# Patient Record
Sex: MALE | Race: AMERICAN INDIAN OR ALASKA NATIVE
[De-identification: names, ages, dates, MRNs, and addresses within clinical notes are randomized per-mention and may not be internally consistent; named-entity substitution may affect disease eponyms.]

---

## 2018-07-13 NOTE — HISTORY AND PHYSICAL REPORT
History of Present Illness


Chief complaint: 





I need help, I cant stop drinking


History of present illness: 


47 YO Male with Obesity, HTN, HLD, ETOH Dependence admitted directly for 

Alcohol Withdrawl Syndrome. Pt states that he drinks vodka daily, and that his 

last drink was this morning. Pt also states that he has experienced nausea, 

feeling helpless, agitation, insomnia, dizziness, abdominal discomfort, 

agitation, confusion, headaches, tremors. Pt also acknowledges feeling anxious. 

Pt seen and evaluated upon arrival to have ETOH Dependence with Alcohol 

Withdrawl. Pt admitted to [facility] for medical stabilization.  











Past History


Past Medical History: hypertension, hyperlipidemia


Past Surgical History: total hip replacement


Social history: single, alcohol abuse


Family history: hypertension





Medications and Allergies


 Allergies











Allergy/AdvReac Type Severity Reaction Status Date / Time


 


Iodine and Iodide Containing AdvReac  Anaphylaxis Verified 07/13/18 16:53





Produc     


 


latex AdvReac  Anaphylaxis Verified 07/13/18 16:52


 


shrimp AdvReac  Anaphylaxis Verified 07/13/18 16:50














Review of Systems


Constitutional: no weight loss, no weight gain, no fever, no chills


Ears, nose, mouth and throat: no ear pain, no ear discharge, no tinnitis, no 

decreased hearing, no nose pain, no nasal congestion, no nasal discharge


Cardiovascular: no chest pain, no orthopnea, no palpitations, no rapid/

irregular heart beat, no edema, no syncope, no lightheadedness, no shortness of 

breath


Respiratory: no cough, no cough with sputum, no excessive sputum, no hemoptysis

, no shortness of breath, no dyspnea on exertion


Gastrointestinal: abdominal pain, nausea, no vomiting, no diarrhea, no 

constipation, no melena, no hematochezia


Genitourinary Male: no hematuria, no flank pain, no discharge, no urinary 

frequency, no urinary hesitancy


Rectal: no pain, no incontinence, no bleeding


Musculoskeletal: no neck stiffness, no neck pain, no shooting arm pain, no arm 

numbness/tingling, no low back pain, no shooting leg pain, no leg numbness/

tingling


Integumentary: no rash, no pruritis, no redness, no sores, no wounds


Neurological: no transient paralysis, no paralysis, no weakness, no parathesias


Psychiatric: anxiety, change in sleep habits, insomnia, hopelessness, anhedonia

, anxiety attacks, confusion, irritability


Endocrine: no cold intolerance, no heat intolerance, no polyphagia, no 

excessive thirst, no polydipsia, no polyuria


Hematologic/Lymphatic: no easy bruising, no easy bleeding, no lymphadenopathy, 

no lymphedema


Allergic/Immunologic: no urticaria, no allergic rhinitis, no wheezing, no 

persistent infections, no anaphylaxis, no angioedema





Exam





- Constitutional


General appearance: Present: obese





- EENT


Eyes: Present: PERRL


ENT: hearing intact, clear oral mucosa





- Neck


Neck: Present: supple, normal ROM





- Respiratory


Respiratory effort: normal


Respiratory: bilateral: CTA





- Cardiovascular


Heart Sounds: Present: S1 & S2.  Absent: rub, click





- Extremities


Extremities: pulses symmetrical, No edema


Peripheral Pulses: within normal limits





- Abdominal


General gastrointestinal: Present: soft, non-tender, non-distended, normal 

bowel sounds


Male genitourinary: Present: normal





- Integumentary


Integumentary: Present: clear, warm, dry





- Musculoskeletal


Musculoskeletal: gait normal, strength equal bilaterally





- Psychiatric


Psychiatric: appropriate mood/affect, intact judgment & insight, agitated





- Neurologic


Neurologic: CNII-XII intact, moves all extremities





Results





- Labs


CBC & Chem 7: 


 07/13/18 18:26








Assessment and Plan





- Patient Problems


(1) Alcohol withdrawal syndrome with perceptual disturbance


Current Visit: Yes   Status: Acute   


Plan to address problem: 


Alcohol withdrawl protocol: Librium Taper, Ativan PRN, IVF resuscitation, CBC, 

CMP, Urinalysis, BAL, Screening EKG, banana bag, 








(2) HTN (hypertension)


Current Visit: Yes   Status: Acute   


Qualifiers: 


   Hypertension type: essential hypertension   Qualified Code(s): I10 - 

Essential (primary) hypertension   


Plan to address problem: 


monitor bp q shift, resume prehospital medication








(3) HLD (hyperlipidemia)


Current Visit: Yes   Status: Acute   


Qualifiers: 


   Hyperlipidemia type: mixed hyperlipidemia   Qualified Code(s): E78.2 - Mixed 

hyperlipidemia   


Plan to address problem: 


continue prehospital medication.








(4) DVT prophylaxis


Current Visit: Yes   Status: Acute   


Plan to address problem: 


SCD to BLE while in bed.

## 2018-07-14 NOTE — PROGRESS NOTE
Assessment and Plan


Assessment and plan: 


-- Alcohol withdrawal syndrome with perceptual disturbance


Alcohol withdrawl protocol:


Librium Taper, Ativan PRN, IVF resuscitation,


And he is supportive care





--Alcoholic Liver Disease


Closely monitor LFTs,supportive care





-- HTN (hypertension)


monitor bp q shift, resume prehospital medication





-- HLD (hyperlipidemia)


continue prehospital medication. 





--Chronic alcohol use;


 Counseling strongly advised to quit alcohol intake





-- DVT prophylaxis


SCD to BLE while in bed





Closely monitor the patient and adjust her management as needed


Plan of care is reviewed with the patient and his nurse





History


Interval history: 


Patient seen and examined medical records reviewed


Patient feels better no new complaints


Vital signs reviewed


Alert awake oriented 3 not in acute distress








Hospitalist Physical





- Constitutional


Vitals: 


 











Temp Pulse Resp BP Pulse Ox


 


 98.7 F   101 H  18   104/72   97 


 


 07/14/18 07:48  07/14/18 07:48  07/14/18 07:48  07/14/18 07:48  07/14/18 07:48











General appearance: Present: no acute distress, well-nourished, obese, other (

no tremulousness or agitation)





- EENT


Eyes: Present: PERRL, EOM intact





- Neck


Neck: Present: supple, normal ROM





- Respiratory


Respiratory effort: normal


Respiratory: bilateral: diminished, negative: rales, rhonchi, wheezing





- Cardiovascular


Rhythm: regular


Heart Sounds: Present: S1 & S2





- Extremities


Extremities: no ischemia, No edema





- Abdominal


General gastrointestinal: soft, non-tender, non-distended, normal bowel sounds





- Integumentary


Integumentary: Present: clear, warm





- Psychiatric


Psychiatric: appropriate mood/affect, cooperative





- Neurologic


Neurologic: CNII-XII intact, moves all extremities





Results





- Labs


CBC & Chem 7: 


 07/13/18 18:26





 07/13/18 18:26


Labs: 


 Laboratory Last Values











WBC  5.3 K/mm3 (4.5-11.0)   07/13/18  18:26    


 


RBC  5.20 M/mm3 (3.65-5.03)  H  07/13/18  18:26    


 


Hgb  13.7 gm/dl (11.8-15.2)   07/13/18  18:26    


 


Hct  41.5 % (35.5-45.6)   07/13/18  18:26    


 


MCV  80 fl (84-94)  L  07/13/18  18:26    


 


MCH  26 pg (28-32)  L  07/13/18  18:26    


 


MCHC  33 % (32-34)   07/13/18  18:26    


 


RDW  18.0 % (13.2-15.2)  H  07/13/18  18:26    


 


Plt Count  295 K/mm3 (140-440)   07/13/18  18:26    


 


PT  14.3 Sec. (12.2-14.9)   07/13/18  18:26    


 


INR  1.05  (0.87-1.13)   07/13/18  18:26    


 


Sodium  138 mmol/L (137-145)   07/13/18  18:26    


 


Potassium  4.0 mmol/L (3.6-5.0)   07/13/18 18:26    


 


Chloride  100.0 mmol/L ()   07/13/18  18:26    


 


Carbon Dioxide  19 mmol/L (22-30)  L  07/13/18  18:26    


 


Anion Gap  23 mmol/L  07/13/18  18:26    


 


BUN  10 mg/dL (9-20)   07/13/18  18:26    


 


Creatinine  0.7 mg/dL (0.8-1.5)  L  07/13/18  18:26    


 


Estimated GFR  > 60 ml/min  07/13/18  18:26    


 


BUN/Creatinine Ratio  14 %  07/13/18  18:26    


 


Glucose  122 mg/dL ()  H  07/13/18  18:26    


 


Calcium  9.2 mg/dL (8.4-10.2)   07/13/18  18:26    


 


Total Bilirubin  0.60 mg/dL (0.1-1.2)   07/13/18  18:26    


 


AST  119 units/L (5-40)  H  07/13/18  18:26    


 


ALT  62 units/L (7-56)  H  07/13/18  18:26    


 


Alkaline Phosphatase  145 units/L ()  H  07/13/18  18:26    


 


Total Protein  8.0 g/dL (6.3-8.2)   07/13/18  18:26    


 


Albumin  4.3 g/dL (3.9-5)   07/13/18  18:26    


 


Albumin/Globulin Ratio  1.2 %  07/13/18  18:26    


 


Urine Color  Yellow  (Yellow)   07/14/18  04:00    


 


Urine Turbidity  Clear  (Clear)   07/14/18  04:00    


 


Urine pH  5.0  (5.0-7.0)   07/14/18  04:00    


 


Ur Specific Gravity  1.027  (1.003-1.030)   07/14/18  04:00    


 


Urine Protein  <15 mg/dl mg/dL (Negative)   07/14/18  04:00    


 


Urine Glucose (UA)  50 mg/dL (Negative)   07/14/18  04:00    


 


Urine Ketones  Neg mg/dL (Negative)   07/14/18  04:00    


 


Urine Blood  Neg  (Negative)   07/14/18  04:00    


 


Urine Nitrite  Neg  (Negative)   07/14/18  04:00    


 


Urine Bilirubin  Neg  (Negative)   07/14/18  04:00    


 


Urine Urobilinogen  < 2.0 mg/dL (<2.0)   07/14/18  04:00    


 


Ur Leukocyte Esterase  Neg  (Negative)   07/14/18  04:00    


 


Urine WBC (Auto)  < 1.0 /HPF (0.0-6.0)   07/14/18  04:00    


 


Urine RBC (Auto)  3.0 /HPF (0.0-6.0)   07/14/18  04:00    


 


Urine Mucus  1+ /HPF  07/14/18  04:00    


 


Plasma/Serum Alcohol  0.27 % (0-0.07)  H  07/13/18  18:26

## 2018-07-15 NOTE — PROGRESS NOTE
Assessment and Plan


Assessment and plan: 


--Diarrhea


 loperamide, supportive care, advised plenty of fluids





-- Alcohol withdrawal syndrome with perceptual disturbance


Alcohol withdrawl protocol:


Librium Taper, Ativan PRN, IVF resuscitation, 


And he is supportive care





--Alcoholic Liver Disease


Closely monitor LFTs,supportive care





-- HTN (hypertension)


monitor bp q shift, resume prehospital medication





-- HLD (hyperlipidemia)


continue prehospital medication. 





--Chronic alcohol use;


 Counseling strongly advised to quit alcohol intake





-- DVT prophylaxis


SCD to BLE while in bed





Closely monitor the patient and adjust her management as needed


Plan of care is reviewed with the patient and his nurse








History


Interval history: 


Since seen and examined medical records reviewed


Complains of some diarrhea


No tremulousness or agitation or aggression


Alert awake oriented 3 


vital signs reviewed








Hospitalist Physical





- Constitutional


Vitals: 


 











Temp Pulse Resp BP Pulse Ox


 


 98.4 F   101 H  20   123/83   97 


 


 07/15/18 07:43  07/15/18 07:43  07/15/18 07:43  07/15/18 07:43  07/15/18 07:43











General appearance: Present: no acute distress, well-nourished, obese, other (

no tremulousness or agitation)





- EENT


Eyes: Present: PERRL, EOM intact





- Neck


Neck: Present: supple, normal ROM





- Respiratory


Respiratory effort: normal


Respiratory: bilateral: diminished, negative: rales, rhonchi, wheezing





- Cardiovascular


Rhythm: regular


Heart Sounds: Present: S1 & S2





- Extremities


Extremities: no ischemia, No edema





- Abdominal


General gastrointestinal: soft, non-tender, non-distended, normal bowel sounds





- Integumentary


Integumentary: Present: clear, warm





- Psychiatric


Psychiatric: appropriate mood/affect, cooperative





- Neurologic


Neurologic: CNII-XII intact, moves all extremities





Results





- Labs


CBC & Chem 7: 


 07/13/18 18:26





 07/13/18 18:26


Labs: 


 Laboratory Last Values











WBC  5.3 K/mm3 (4.5-11.0)   07/13/18  18:26    


 


RBC  5.20 M/mm3 (3.65-5.03)  H  07/13/18  18:26    


 


Hgb  13.7 gm/dl (11.8-15.2)   07/13/18  18:26    


 


Hct  41.5 % (35.5-45.6)   07/13/18  18:26    


 


MCV  80 fl (84-94)  L  07/13/18  18:26    


 


MCH  26 pg (28-32)  L  07/13/18  18:26    


 


MCHC  33 % (32-34)   07/13/18  18:26    


 


RDW  18.0 % (13.2-15.2)  H  07/13/18  18:26    


 


Plt Count  295 K/mm3 (140-440)   07/13/18  18:26    


 


PT  14.3 Sec. (12.2-14.9)   07/13/18  18:26    


 


INR  1.05  (0.87-1.13)   07/13/18  18:26    


 


Sodium  138 mmol/L (137-145)   07/13/18 18:26    


 


Potassium  4.0 mmol/L (3.6-5.0)   07/13/18 18:26    


 


Chloride  100.0 mmol/L ()   07/13/18 18:26    


 


Carbon Dioxide  19 mmol/L (22-30)  L  07/13/18 18:26    


 


Anion Gap  23 mmol/L  07/13/18  18:26    


 


BUN  10 mg/dL (9-20)   07/13/18  18:26    


 


Creatinine  0.7 mg/dL (0.8-1.5)  L  07/13/18  18:26    


 


Estimated GFR  > 60 ml/min  07/13/18  18:26    


 


BUN/Creatinine Ratio  14 %  07/13/18  18:26    


 


Glucose  122 mg/dL ()  H  07/13/18 18:26    


 


Calcium  9.2 mg/dL (8.4-10.2)   07/13/18 18:26    


 


Total Bilirubin  0.60 mg/dL (0.1-1.2)   07/13/18  18:26    


 


AST  119 units/L (5-40)  H  07/13/18  18:26    


 


ALT  62 units/L (7-56)  H  07/13/18  18:26    


 


Alkaline Phosphatase  145 units/L ()  H  07/13/18  18:26    


 


Total Protein  8.0 g/dL (6.3-8.2)   07/13/18  18:26    


 


Albumin  4.3 g/dL (3.9-5)   07/13/18  18:26    


 


Albumin/Globulin Ratio  1.2 %  07/13/18  18:26    


 


Urine Color  Yellow  (Yellow)   07/14/18  04:00    


 


Urine Turbidity  Clear  (Clear)   07/14/18  04:00    


 


Urine pH  5.0  (5.0-7.0)   07/14/18  04:00    


 


Ur Specific Gravity  1.027  (1.003-1.030)   07/14/18  04:00    


 


Urine Protein  <15 mg/dl mg/dL (Negative)   07/14/18  04:00    


 


Urine Glucose (UA)  50 mg/dL (Negative)   07/14/18  04:00    


 


Urine Ketones  Neg mg/dL (Negative)   07/14/18  04:00    


 


Urine Blood  Neg  (Negative)   07/14/18  04:00    


 


Urine Nitrite  Neg  (Negative)   07/14/18  04:00    


 


Urine Bilirubin  Neg  (Negative)   07/14/18  04:00    


 


Urine Urobilinogen  < 2.0 mg/dL (<2.0)   07/14/18  04:00    


 


Ur Leukocyte Esterase  Neg  (Negative)   07/14/18  04:00    


 


Urine WBC (Auto)  < 1.0 /HPF (0.0-6.0)   07/14/18  04:00    


 


Urine RBC (Auto)  3.0 /HPF (0.0-6.0)   07/14/18  04:00    


 


Urine Mucus  1+ /HPF  07/14/18  04:00    


 


Plasma/Serum Alcohol  0.27 % (0-0.07)  H  07/13/18  18:26

## 2018-07-16 NOTE — DISCHARGE SUMMARY
Providers





- Providers


Date of Admission: 


07/13/18 17:26





Date of discharge: 07/16/18


Attending physician: 


AMY J KOCHERLA





Primary care physician: 


ROOSEVELT GALVAN








Hospitalization


Reason for admission: alcohol withdrawal management and medical stabilization 


Hospital course: 


Very pleasant 48-year-old male patient with history of hypertension 

dyslipidemia 


chronic alcohol use was admitted to medical stabilization unit


With alcohol withdrawal symptoms for medical stabilization.


The patient was admitted and managed per protocols


Blood pressures closely monitored, electrolyte imbalances were corrected


Symptoms significantly improved


Today he is comfortable no new complaints


No tremulousness or agitation


Vital signs stable, Face-to-face evaluation and physical examination 


done by me prior to discharge is unremarkable


Patient is hemodynamically and clinically stable for discharge





Discharge diagnosis;


Alcohol withdrawal syndrome;


Alcoholic liver disease


Hypertension


Dyslipidemia


Diarrhea


Tremulousness 


and anxiety resolved





Disposition: [facility]-01 TO HOME OR SELFCARE


Time spent for discharge: 32 min





Core Measure Documentation





- Palliative Care


Palliative Care/ Comfort Measures: Not Applicable





- Core Measures


Any of the following diagnoses?: none





Exam





- Constitutional


Vitals: 


 











Temp Pulse Resp BP Pulse Ox


 


 98.9 F   99 H  20   106/76   94 


 


 07/16/18 07:52  07/16/18 07:52  07/16/18 10:00  07/16/18 07:52  07/16/18 10:00











General appearance: Present: no acute distress, well-nourished, obese





- EENT


Eyes: Present: PERRL, EOM intact





- Neck


Neck: Present: supple, normal ROM





- Respiratory


Respiratory effort: normal


Respiratory: bilateral: diminished, negative: rales, rhonchi, wheezing





- Cardiovascular


Rhythm: regular


Heart Sounds: Present: S1 & S2





- Extremities


Extremities: no ischemia, No edema





- Abdominal


General gastrointestinal: Present: soft, non-tender, non-distended, normal 

bowel sounds





- Integumentary


Integumentary: Present: clear, warm





- Musculoskeletal


Musculoskeletal: strength equal bilaterally, right sided weakness





- Psychiatric


Psychiatric: appropriate mood/affect, cooperative





- Neurologic


Neurologic: CNII-XII intact, moves all extremities





Plan


Activity: advance as tolerated, fall precautions


Diet: regular


Additional Instructions: f/u PMD 3-5 days.  Advised to quit alcohol and 

recreational drug use.  Do not drive or operate heavy machinery under the 

influence of alcohol


Follow up with: 


ROOSEVELT GALVAN MD [Primary Care Provider] - 7 Days


Prescriptions: 


Folic Acid [Folvite] 1 mg PO QDAY #30 tablet


Thiamine [Vitamin B-1] 100 mg PO QDAY #30 tablet

## 2019-01-18 ENCOUNTER — HOSPITAL ENCOUNTER (INPATIENT)
Dept: HOSPITAL 5 - 3A | Age: 54
LOS: 3 days | Discharge: HOME | DRG: 897 | End: 2019-01-21
Attending: INTERNAL MEDICINE | Admitting: INTERNAL MEDICINE
Payer: MEDICARE

## 2019-01-18 DIAGNOSIS — R19.7: ICD-10-CM

## 2019-01-18 DIAGNOSIS — K21.9: ICD-10-CM
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
## 2019-01-18 DIAGNOSIS — F10.232: Primary | ICD-10-CM

## 2019-01-18 DIAGNOSIS — I10: ICD-10-CM

## 2019-01-18 DIAGNOSIS — E78.2: ICD-10-CM

## 2019-01-18 DIAGNOSIS — E86.0: ICD-10-CM

## 2019-01-18 DIAGNOSIS — F32.9: ICD-10-CM

## 2019-01-18 PROCEDURE — 85025 COMPLETE CBC W/AUTO DIFF WBC: CPT

## 2019-01-18 PROCEDURE — 85007 BL SMEAR W/DIFF WBC COUNT: CPT

## 2019-01-18 PROCEDURE — 36415 COLL VENOUS BLD VENIPUNCTURE: CPT

## 2019-01-18 PROCEDURE — 80048 BASIC METABOLIC PNL TOTAL CA: CPT

## 2019-01-18 PROCEDURE — 87177 OVA AND PARASITES SMEARS: CPT

## 2019-01-18 PROCEDURE — 87116 MYCOBACTERIA CULTURE: CPT

## 2019-01-18 PROCEDURE — 82270 OCCULT BLOOD FECES: CPT

## 2019-01-18 PROCEDURE — 80053 COMPREHEN METABOLIC PANEL: CPT

## 2019-01-18 RX ADMIN — LORAZEPAM SCH MG: 1 TABLET ORAL at 14:57

## 2019-01-18 RX ADMIN — LORAZEPAM SCH MG: 1 TABLET ORAL at 21:50

## 2019-01-18 RX ADMIN — ENOXAPARIN SODIUM SCH MG: 100 INJECTION SUBCUTANEOUS at 14:57

## 2019-01-18 RX ADMIN — LORAZEPAM SCH MG: 1 TABLET ORAL at 17:30

## 2019-01-18 RX ADMIN — Medication SCH ML: at 21:50

## 2019-01-18 NOTE — HISTORY AND PHYSICAL REPORT
History of Present Illness


Date of examination: 01/18/19


Date of admission: 


01/18/19 13:32








Medications and Allergies


                                    Allergies











Allergy/AdvReac Type Severity Reaction Status Date / Time


 


Iodine and Iodide Containing AdvReac  Anaphylaxis Verified 07/13/18 16:53





Produc     


 


latex AdvReac  Anaphylaxis Verified 07/13/18 16:52


 


shrimp AdvReac  Anaphylaxis Verified 07/13/18 16:50











                                Home Medications











 Medication  Instructions  Recorded  Confirmed  Last Taken  Type


 


Lisinopril 20 mg PO DAILY 07/15/18 07/15/18 1 Week Ago History





    ~07/08/18 


 


Wellbutrin  mg PO DAILY 07/15/18 07/15/18 1 Week Ago History





    ~07/08/18 





    300mg 


 


Zantac 150 MG  mg PO TID 07/15/18 07/15/18 1 Week Ago History





    ~07/08/18 


 


Folic Acid [Folvite] 1 mg PO QDAY #30 tablet 07/16/18  Unknown Rx


 


Thiamine [Vitamin B-1] 100 mg PO QDAY #30 tablet 07/16/18  Unknown Rx

## 2019-01-19 LAB
%HYPO/RBC NFR BLD AUTO: (no result) %
ANISOCYTOSIS BLD QL SMEAR: (no result)
BAND NEUTROPHILS # (MANUAL): 0 K/MM3
BUN SERPL-MCNC: 9 MG/DL (ref 9–20)
BUN/CREAT SERPL: 11 %
CALCIUM SERPL-MCNC: 8.2 MG/DL (ref 8.4–10.2)
HCT VFR BLD CALC: 36.9 % (ref 35.5–45.6)
HEMOLYSIS INDEX: 15
HGB BLD-MCNC: 12.3 GM/DL (ref 11.8–15.2)
MCHC RBC AUTO-ENTMCNC: 33 % (ref 32–34)
MCV RBC AUTO: 77 FL (ref 84–94)
MYELOCYTES # (MANUAL): 0 K/MM3
OVALOCYTES BLD QL SMEAR: (no result)
PLATELET # BLD: 279 K/MM3 (ref 140–440)
PROMYELOCYTES # (MANUAL): 0 K/MM3
RBC # BLD AUTO: 4.79 M/MM3 (ref 3.65–5.03)
STOMATOCYTES BLD QL SMEAR: (no result)
TOTAL CELLS COUNTED BLD: 100

## 2019-01-19 RX ADMIN — SODIUM CHLORIDE SCH MLS/HR: 0.9 INJECTION, SOLUTION INTRAVENOUS at 22:45

## 2019-01-19 RX ADMIN — FAMOTIDINE SCH MG: 20 TABLET ORAL at 08:24

## 2019-01-19 RX ADMIN — Medication SCH: at 10:01

## 2019-01-19 RX ADMIN — LORAZEPAM SCH: 1 TABLET ORAL at 10:01

## 2019-01-19 RX ADMIN — LORAZEPAM SCH MG: 1 TABLET ORAL at 08:26

## 2019-01-19 RX ADMIN — Medication SCH ML: at 22:46

## 2019-01-19 RX ADMIN — FOLIC ACID SCH MG: 1 TABLET ORAL at 08:25

## 2019-01-19 RX ADMIN — BUPROPION HYDROCHLORIDE SCH MG: 150 TABLET, EXTENDED RELEASE ORAL at 12:58

## 2019-01-19 RX ADMIN — FAMOTIDINE SCH MG: 20 TABLET ORAL at 12:59

## 2019-01-19 RX ADMIN — SODIUM CHLORIDE SCH MLS/HR: 0.9 INJECTION, SOLUTION INTRAVENOUS at 14:38

## 2019-01-19 RX ADMIN — ENOXAPARIN SODIUM SCH MG: 100 INJECTION SUBCUTANEOUS at 08:24

## 2019-01-19 RX ADMIN — LORAZEPAM SCH MG: 1 TABLET ORAL at 06:18

## 2019-01-19 RX ADMIN — FAMOTIDINE SCH MG: 20 TABLET ORAL at 19:57

## 2019-01-19 RX ADMIN — LORAZEPAM SCH MG: 1 TABLET ORAL at 16:02

## 2019-01-19 RX ADMIN — ENOXAPARIN SODIUM SCH: 100 INJECTION SUBCUTANEOUS at 10:01

## 2019-01-19 RX ADMIN — LORAZEPAM SCH MG: 1 TABLET ORAL at 02:34

## 2019-01-19 RX ADMIN — FOLIC ACID SCH: 1 TABLET ORAL at 10:01

## 2019-01-19 RX ADMIN — Medication SCH ML: at 08:27

## 2019-01-19 RX ADMIN — Medication SCH MG: at 08:25

## 2019-01-19 RX ADMIN — PANCRELIPASE LIPASE, PANCRELIPASE AMYLASE, AND PANCRELIPASE PROTEASE SCH EACH: 10500; 61500; 35500 CAPSULE, DELAYED RELEASE ORAL at 17:34

## 2019-01-19 RX ADMIN — ACETAMINOPHEN PRN MG: 325 TABLET ORAL at 12:59

## 2019-01-19 RX ADMIN — ACETAMINOPHEN PRN MG: 325 TABLET ORAL at 19:53

## 2019-01-19 RX ADMIN — LISINOPRIL SCH MG: 20 TABLET ORAL at 08:26

## 2019-01-19 RX ADMIN — LISINOPRIL SCH: 20 TABLET ORAL at 10:01

## 2019-01-19 NOTE — PROGRESS NOTE
Assessment and Plan


Assessment and plan: 


Alcohol withdrawal


- Patient is being managed according to [facility] protocol for alcohol withdrawal


- Said mild tremer


- Patient was heavy drinker


Acute diarrhea with dehydration


- Patient didn't have any recent use of antibiotics


- will do stool exam


-Patient was given bolus 1L normal saline and will continue with maintenance


Disposition


- Continue inpatient care








History


Interval history: 





Patient was complaining 7 episodes of diarrhea overnight, he said he is feeling 

dehydrated.





Hospitalist Physical





- Physical exam


Narrative exam: 





 Not in cardiopulmonary distress. 


 The patient appeared well nourished and normally developed.


 Vital signs as documented.


 Head exam is unremarkable.


 No scleral icterus .


 Neck is without jugular venous distension, thyromegaly, or carotid bruits. 


 Lungs are clear to auscultation.


Cardiac exam reveals regular rate and  Rhythm. 


Abdominal exam reveals normal bowel sounds. 


Extremities are nonedematous and both femoral and pedal pulses are normal.


CNS: Alert and oriented 3.  No focal weakness.





- Constitutional


Vitals: 


                                        











Temp Pulse Resp BP Pulse Ox


 


 98.8 F   96 H  17   126/86   92 


 


 01/19/19 08:08  01/19/19 08:26  01/19/19 08:08  01/19/19 08:26  01/19/19 08:08














Results





- Labs


CBC & Chem 7: 


                                 01/19/19 05:07





                                 01/19/19 12:37


Labs: 


                             Laboratory Last Values











WBC  5.1 K/mm3 (4.5-11.0)   01/19/19  05:07    


 


RBC  4.79 M/mm3 (3.65-5.03)   01/19/19  05:07    


 


Hgb  12.3 gm/dl (11.8-15.2)   01/19/19  05:07    


 


Hct  36.9 % (35.5-45.6)   01/19/19  05:07    


 


MCV  77 fl (84-94)  L  01/19/19  05:07    


 


MCH  26 pg (28-32)  L  01/19/19  05:07    


 


MCHC  33 % (32-34)   01/19/19  05:07    


 


RDW  19.0 % (13.2-15.2)  H  01/19/19  05:07    


 


Plt Count  279 K/mm3 (140-440)   01/19/19  05:07    


 


Lymph % (Auto)  Np   01/19/19  05:07    


 


Add Manual Diff  Complete   01/19/19  05:07    


 


Total Counted  100   01/19/19  05:07    


 


Seg Neutrophils %  Np   01/19/19  05:07    


 


Seg Neuts % (Manual)  33.0 % (40.0-70.0)  L  01/19/19  05:07    


 


Band Neutrophils %  0 %  01/19/19  05:07    


 


Lymphocytes % (Manual)  60.0 % (13.4-35.0)  H  01/19/19  05:07    


 


Reactive Lymphs % (Man)  0 %  01/19/19  05:07    


 


Monocytes % (Manual)  6.0 % (0.0-7.3)   01/19/19  05:07    


 


Eosinophils % (Manual)  1.0 % (0.0-4.3)   01/19/19  05:07    


 


Basophils % (Manual)  0 % (0.0-1.8)   01/19/19  05:07    


 


Metamyelocytes %  0 %  01/19/19  05:07    


 


Myelocytes %  0 %  01/19/19  05:07    


 


Promyelocytes %  0 %  01/19/19  05:07    


 


Blast Cells %  0 %  01/19/19  05:07    


 


Nucleated RBC %  Not Reportable   01/19/19  05:07    


 


Seg Neutrophils # Man  1.7 K/mm3 (1.8-7.7)  L  01/19/19  05:07    


 


Band Neutrophils #  0.0 K/mm3  01/19/19  05:07    


 


Lymphocytes # (Manual)  3.1 K/mm3 (1.2-5.4)   01/19/19  05:07    


 


Abs React Lymphs (Man)  0.0 K/mm3  01/19/19  05:07    


 


Monocytes # (Manual)  0.3 K/mm3 (0.0-0.8)   01/19/19  05:07    


 


Eosinophils # (Manual)  0.1 K/mm3 (0.0-0.4)   01/19/19  05:07    


 


Basophils # (Manual)  0.0 K/mm3 (0.0-0.1)   01/19/19  05:07    


 


Metamyelocytes #  0.0 K/mm3  01/19/19  05:07    


 


Myelocytes #  0.0 K/mm3  01/19/19  05:07    


 


Promyelocytes #  0.0 K/mm3  01/19/19  05:07    


 


Blast Cells #  0.0 K/mm3  01/19/19  05:07    


 


WBC Morphology  Not Reportable   01/19/19  05:07    


 


Hypersegmented Neuts  Not Reportable   01/19/19  05:07    


 


Hyposegmented Neuts  Not Reportable   01/19/19  05:07    


 


Hypogranular Neuts  Not Reportable   01/19/19  05:07    


 


Smudge Cells  Not Reportable   01/19/19  05:07    


 


Toxic Granulation  Not Reportable   01/19/19  05:07    


 


Toxic Vacuolation  Not Reportable   01/19/19  05:07    


 


Dohle Bodies  Not Reportable   01/19/19  05:07    


 


Pelger-Huet Anomaly  Not Reportable   01/19/19  05:07    


 


Lita Rods  Not Reportable   01/19/19  05:07    


 


Platelet Estimate  Appears normal   01/19/19  05:07    


 


Clumped Platelets  Not Reportable   01/19/19  05:07    


 


Plt Clumps, EDTA  Not Reportable   01/19/19  05:07    


 


Large Platelets  Few   01/19/19  05:07    


 


Giant Platelets  Not Reportable   01/19/19  05:07    


 


Platelet Satelliting  Not Reportable   01/19/19  05:07    


 


Plt Morphology Comment  Not Reportable   01/19/19  05:07    


 


RBC Morphology  Not Reportable   01/19/19  05:07    


 


Dimorphic RBCs  Not Reportable   01/19/19  05:07    


 


Polychromasia  Not Reportable   01/19/19  05:07    


 


Hypochromasia  1+   01/19/19  05:07    


 


Poikilocytosis  Not Reportable   01/19/19  05:07    


 


Anisocytosis  1+   01/19/19  05:07    


 


Microcytosis  1+   01/19/19  05:07    


 


Macrocytosis  Not Reportable   01/19/19  05:07    


 


Spherocytes  Not Reportable   01/19/19  05:07    


 


Pappenheimer Bodies  Not Reportable   01/19/19  05:07    


 


Sickle Cells  Not Reportable   01/19/19  05:07    


 


Target Cells  Not Reportable   01/19/19  05:07    


 


Tear Drop Cells  Few   01/19/19  05:07    


 


Ovalocytes  2+   01/19/19  05:07    


 


Stomatocytes  2+   01/19/19  05:07    


 


Helmet Cells  Not Reportable   01/19/19  05:07    


 


Corral-Karlstad Bodies  Not Reportable   01/19/19  05:07    


 


Cabot Rings  Not Reportable   01/19/19  05:07    


 


Gita Cells  Not Reportable   01/19/19  05:07    


 


Bite Cells  Not Reportable   01/19/19  05:07    


 


Crenated Cell  Not Reportable   01/19/19  05:07    


 


Elliptocytes  Not Reportable   01/19/19  05:07    


 


Acanthocytes (Spur)  Not Reportable   01/19/19  05:07    


 


Rouleaux  Not Reportable   01/19/19  05:07    


 


Hemoglobin C Crystals  Not Reportable   01/19/19  05:07    


 


Schistocytes  Not Reportable   01/19/19  05:07    


 


Malaria parasites  Not Reportable   01/19/19  05:07    


 


Ricardo Bodies  Not Reportable   01/19/19  05:07    


 


Hem Pathologist Commnt  No   01/19/19  05:07    


 


Sodium  138 mmol/L (137-145)   01/19/19  12:37    


 


Potassium  4.2 mmol/L (3.6-5.0)   01/19/19  12:37    


 


Chloride  101.1 mmol/L ()   01/19/19  12:37    


 


Carbon Dioxide  22 mmol/L (22-30)   01/19/19  12:37    


 


Anion Gap  19 mmol/L  01/19/19  12:37    


 


BUN  9 mg/dL (9-20)   01/19/19  12:37    


 


Creatinine  0.8 mg/dL (0.8-1.5)   01/19/19  12:37    


 


Estimated GFR  > 60 ml/min  01/19/19  12:37    


 


BUN/Creatinine Ratio  11 %  01/19/19  12:37    


 


Glucose  104 mg/dL ()  H  01/19/19  12:37    


 


Calcium  8.2 mg/dL (8.4-10.2)  L  01/19/19  12:37

## 2019-01-19 NOTE — HISTORY AND PHYSICAL REPORT
CHIEF COMPLAINT:  ETOH dependence and help with the alcohol withdrawal.



HISTORY OF PRESENT ILLNESS:  A 53-year-old -American male comes in for

help with the ETOH dependence and ETOH withdrawal.  The patient has been

drinking alcohol from age 9.  Apparently, his alcohol intake increased for 6

years while he was in  since the age of [age] years.  Then, he became a

social drinker, but anywayagain in the last 3 years, the patient has been

drinking heavily about a fifth of vodka and a fifth of harriet or whatever he can

get his hands on.  The patient has withdrawal symptoms like tremors, nausea, and

vomiting and diarrhea sometimes.  Very anxious.  The patient states he has been

having posttraumatic stress disorder and is supposed to get an appointment with

a psychiatrist in the next 2-3 weeks.  The patient goes to [facility] for his

medical care.



PAST MEDICAL HISTORY:  Significant for hypertension, gastroesophageal reflux

disease, and depression.



CURRENT MEDICATIONS:  Zantac 150 b.i.d., Wellbutrin 300 once a day, thiamine 100

mg once a day, lisinopril 20 mg once a day, folic acid 1 mg once a day.



PAST SURGICAL HISTORY:  None.



FAMILY HISTORY:  Hypertension.



SOCIAL HISTORY:  As mentioned, alcohol on a regular basis one fifth of whiskey

and one fifth of harriet nearly every day for the last 3 years.  Does not smoke. 

No recreational drugs.



REVIEW OF SYSTEMS:  As mentioned, the patient is tremorous and nervous.  No

nausea, no vomiting now.  Had vomiting before.  Otherwise, review of systems

negative.



PHYSICAL EXAMINATION:

GENERAL:  Middle-aged male, cooperative during examination.

VITAL SIGNS:  Blood pressure 111/71, temperature 97.4, pulse is 103,

respirations are 20, sats are 95%.

HEENT:  Unremarkable.  Pupils equal and reactive.

NECK:  Supple, no lymphadenopathy, no thyromegaly.

LUNGS:  Clear to auscultation and percussion.  Good air entry.

CARDIOVASCULAR:  S1, S2 heard.  No gallop, no murmur, no rub.  Apical impulse in

left fifth intercostal space and midclavicular line.

ABDOMEN:  Soft and benign.  No hepatosplenomegaly.  No guarding, no rigidity. 

Hernial orifices are normal.

EXTREMITIES:  Good pedal pulses.  No pedal edema.

CENTRAL NERVOUS SYSTEM:  Alert and oriented x 4, nonfocal exam.

SKIN:  Normal.



LABORATORY DATA:  Pending.



ASSESSMENT AND PLAN:

1.  ETOH dependence and ETOH withdrawal.  The patient initiated on alcohol

medical stabilization orders, which include Ativan taper.  The patient initiated

on Ativan 1 mg every 4 hours p.r.n.  Also, Ativan taper, which includes 1 mg

p.o. q.4 hours around the clock and second day 1 mg p.o. q. 6 around the clock

and third day 1 mg p.o. q. 8. hours,  IV fluids.

2.  Hypertension.  Continue lisinopril.

3.  Depression.  Continue Wellbutrin.

4.  Gastroesophageal reflux disease.  Continue Zantac.

5.  Deep venous thrombosis prophylaxis, Lovenox and GI prophylaxis.





DD: 01/19/2019 

DT: 01/19/2019 

JOB# [id number]  [id number]

KAMARI/DAVID AVERY

## 2019-01-20 LAB
ALBUMIN SERPL-MCNC: 3.5 G/DL (ref 3.9–5)
ALT SERPL-CCNC: 63 UNITS/L (ref 7–56)
BUN SERPL-MCNC: 4 MG/DL (ref 9–20)
BUN/CREAT SERPL: 4 %
CALCIUM SERPL-MCNC: 7.5 MG/DL (ref 8.4–10.2)
HEMOLYSIS INDEX: 2

## 2019-01-20 RX ADMIN — MEGESTROL ACETATE SCH MG: 40 TABLET ORAL at 18:29

## 2019-01-20 RX ADMIN — LORAZEPAM SCH MG: 1 TABLET ORAL at 00:13

## 2019-01-20 RX ADMIN — SODIUM CHLORIDE SCH MLS/HR: 0.9 INJECTION, SOLUTION INTRAVENOUS at 05:42

## 2019-01-20 RX ADMIN — PANCRELIPASE LIPASE, PANCRELIPASE AMYLASE, AND PANCRELIPASE PROTEASE SCH EACH: 10500; 61500; 35500 CAPSULE, DELAYED RELEASE ORAL at 16:59

## 2019-01-20 RX ADMIN — FOLIC ACID SCH MG: 1 TABLET ORAL at 08:04

## 2019-01-20 RX ADMIN — MEGESTROL ACETATE SCH MG: 40 TABLET ORAL at 21:30

## 2019-01-20 RX ADMIN — HYDROXYZINE PAMOATE PRN MG: 50 CAPSULE ORAL at 14:04

## 2019-01-20 RX ADMIN — LOPERAMIDE HYDROCHLORIDE PRN MG: 2 CAPSULE ORAL at 13:50

## 2019-01-20 RX ADMIN — ENOXAPARIN SODIUM SCH MG: 100 INJECTION SUBCUTANEOUS at 08:06

## 2019-01-20 RX ADMIN — ACETAMINOPHEN PRN MG: 325 TABLET ORAL at 15:35

## 2019-01-20 RX ADMIN — GABAPENTIN SCH MG: 100 CAPSULE ORAL at 21:30

## 2019-01-20 RX ADMIN — Medication SCH: at 10:14

## 2019-01-20 RX ADMIN — LOPERAMIDE HYDROCHLORIDE PRN MG: 2 CAPSULE ORAL at 21:30

## 2019-01-20 RX ADMIN — PANCRELIPASE LIPASE, PANCRELIPASE AMYLASE, AND PANCRELIPASE PROTEASE SCH: 10500; 61500; 35500 CAPSULE, DELAYED RELEASE ORAL at 13:50

## 2019-01-20 RX ADMIN — PANCRELIPASE LIPASE, PANCRELIPASE AMYLASE, AND PANCRELIPASE PROTEASE SCH EACH: 10500; 61500; 35500 CAPSULE, DELAYED RELEASE ORAL at 08:07

## 2019-01-20 RX ADMIN — Medication SCH ML: at 08:07

## 2019-01-20 RX ADMIN — LORAZEPAM SCH MG: 1 TABLET ORAL at 20:29

## 2019-01-20 RX ADMIN — BUPROPION HYDROCHLORIDE SCH MG: 150 TABLET, EXTENDED RELEASE ORAL at 08:05

## 2019-01-20 RX ADMIN — LORAZEPAM SCH MG: 1 TABLET ORAL at 13:50

## 2019-01-20 RX ADMIN — SODIUM CHLORIDE SCH MLS/HR: 0.9 INJECTION, SOLUTION INTRAVENOUS at 13:49

## 2019-01-20 RX ADMIN — FAMOTIDINE SCH MG: 20 TABLET ORAL at 08:06

## 2019-01-20 RX ADMIN — FAMOTIDINE SCH MG: 20 TABLET ORAL at 20:30

## 2019-01-20 RX ADMIN — LORAZEPAM SCH MG: 1 TABLET ORAL at 05:41

## 2019-01-20 RX ADMIN — FAMOTIDINE SCH MG: 20 TABLET ORAL at 13:50

## 2019-01-20 RX ADMIN — LISINOPRIL SCH: 20 TABLET ORAL at 10:14

## 2019-01-20 RX ADMIN — Medication SCH: at 10:13

## 2019-01-20 RX ADMIN — Medication SCH MG: at 08:04

## 2019-01-20 RX ADMIN — BUPROPION HYDROCHLORIDE SCH: 150 TABLET, EXTENDED RELEASE ORAL at 10:14

## 2019-01-20 RX ADMIN — GABAPENTIN SCH MG: 100 CAPSULE ORAL at 14:04

## 2019-01-20 RX ADMIN — ENOXAPARIN SODIUM SCH: 100 INJECTION SUBCUTANEOUS at 10:13

## 2019-01-20 RX ADMIN — FOLIC ACID SCH: 1 TABLET ORAL at 10:13

## 2019-01-20 RX ADMIN — MEGESTROL ACETATE SCH MG: 40 TABLET ORAL at 16:58

## 2019-01-20 RX ADMIN — LISINOPRIL SCH MG: 20 TABLET ORAL at 08:06

## 2019-01-20 NOTE — PROGRESS NOTE
Assessment and Plan


Assessment and plan: 


Alcohol withdrawal


- Patient is being managed according to [facility] protocol for alcohol withdrawal


- Patient was heavy drinker


Acute diarrhea with dehydration


- Patient didn't have any recent use of antibiotics


- will do stool exam


- Dehydration resolved


- Imodium, stop the fluid


- Megase for his appetie





Resume appropriate home medications


Disposition


- Continue inpatient care








History


Interval history: 





Patient had 4 episodes of diarrhea this morning, patient was alert and oriented.





Hospitalist Physical





- Physical exam


Narrative exam: 





 Not in cardiopulmonary distress. 


 The patient is obese.


 Vital signs as documented.


 Head exam is unremarkable.


 No scleral icterus .


 Neck is without jugular venous distension, thyromegaly, or carotid bruits. 


 Lungs are clear to auscultation.


Cardiac exam reveals regular rate and  Rhythm. 


Abdominal exam reveals normal bowel sounds. 


Extremities are nonedematous and both femoral and pedal pulses are normal.


CNS: Alert and oriented 3.  No focal weakness.





- Constitutional


Vitals: 


                                        











Temp Pulse Resp BP Pulse Ox


 


 97.2 F L  94 H  17   124/71   96 


 


 01/20/19 07:44  01/20/19 08:06  01/20/19 07:44  01/20/19 08:06  01/20/19 07:44














Results





- Labs


CBC & Chem 7: 


                                 01/19/19 05:07





                                 01/20/19 08:35


Labs: 


                             Laboratory Last Values











WBC  5.1 K/mm3 (4.5-11.0)   01/19/19  05:07    


 


RBC  4.79 M/mm3 (3.65-5.03)   01/19/19  05:07    


 


Hgb  12.3 gm/dl (11.8-15.2)   01/19/19  05:07    


 


Hct  36.9 % (35.5-45.6)   01/19/19  05:07    


 


MCV  77 fl (84-94)  L  01/19/19  05:07    


 


MCH  26 pg (28-32)  L  01/19/19  05:07    


 


MCHC  33 % (32-34)   01/19/19  05:07    


 


RDW  19.0 % (13.2-15.2)  H  01/19/19  05:07    


 


Plt Count  279 K/mm3 (140-440)   01/19/19  05:07    


 


Lymph % (Auto)  Np   01/19/19  05:07    


 


Add Manual Diff  Complete   01/19/19  05:07    


 


Total Counted  100   01/19/19  05:07    


 


Seg Neutrophils %  Np   01/19/19  05:07    


 


Seg Neuts % (Manual)  33.0 % (40.0-70.0)  L  01/19/19  05:07    


 


Band Neutrophils %  0 %  01/19/19  05:07    


 


Lymphocytes % (Manual)  60.0 % (13.4-35.0)  H  01/19/19  05:07    


 


Reactive Lymphs % (Man)  0 %  01/19/19  05:07    


 


Monocytes % (Manual)  6.0 % (0.0-7.3)   01/19/19  05:07    


 


Eosinophils % (Manual)  1.0 % (0.0-4.3)   01/19/19  05:07    


 


Basophils % (Manual)  0 % (0.0-1.8)   01/19/19  05:07    


 


Metamyelocytes %  0 %  01/19/19  05:07    


 


Myelocytes %  0 %  01/19/19  05:07    


 


Promyelocytes %  0 %  01/19/19  05:07    


 


Blast Cells %  0 %  01/19/19  05:07    


 


Nucleated RBC %  Not Reportable   01/19/19  05:07    


 


Seg Neutrophils # Man  1.7 K/mm3 (1.8-7.7)  L  01/19/19  05:07    


 


Band Neutrophils #  0.0 K/mm3  01/19/19  05:07    


 


Lymphocytes # (Manual)  3.1 K/mm3 (1.2-5.4)   01/19/19  05:07    


 


Abs React Lymphs (Man)  0.0 K/mm3  01/19/19  05:07    


 


Monocytes # (Manual)  0.3 K/mm3 (0.0-0.8)   01/19/19  05:07    


 


Eosinophils # (Manual)  0.1 K/mm3 (0.0-0.4)   01/19/19  05:07    


 


Basophils # (Manual)  0.0 K/mm3 (0.0-0.1)   01/19/19  05:07    


 


Metamyelocytes #  0.0 K/mm3  01/19/19  05:07    


 


Myelocytes #  0.0 K/mm3  01/19/19  05:07    


 


Promyelocytes #  0.0 K/mm3  01/19/19  05:07    


 


Blast Cells #  0.0 K/mm3  01/19/19  05:07    


 


WBC Morphology  Not Reportable   01/19/19  05:07    


 


Hypersegmented Neuts  Not Reportable   01/19/19  05:07    


 


Hyposegmented Neuts  Not Reportable   01/19/19  05:07    


 


Hypogranular Neuts  Not Reportable   01/19/19  05:07    


 


Smudge Cells  Not Reportable   01/19/19  05:07    


 


Toxic Granulation  Not Reportable   01/19/19  05:07    


 


Toxic Vacuolation  Not Reportable   01/19/19  05:07    


 


Dohle Bodies  Not Reportable   01/19/19  05:07    


 


Pelger-Huet Anomaly  Not Reportable   01/19/19  05:07    


 


Lita Rods  Not Reportable   01/19/19  05:07    


 


Platelet Estimate  Appears normal   01/19/19  05:07    


 


Clumped Platelets  Not Reportable   01/19/19  05:07    


 


Plt Clumps, EDTA  Not Reportable   01/19/19  05:07    


 


Large Platelets  Few   01/19/19  05:07    


 


Giant Platelets  Not Reportable   01/19/19  05:07    


 


Platelet Satelliting  Not Reportable   01/19/19  05:07    


 


Plt Morphology Comment  Not Reportable   01/19/19  05:07    


 


RBC Morphology  Not Reportable   01/19/19  05:07    


 


Dimorphic RBCs  Not Reportable   01/19/19  05:07    


 


Polychromasia  Not Reportable   01/19/19  05:07    


 


Hypochromasia  1+   01/19/19  05:07    


 


Poikilocytosis  Not Reportable   01/19/19  05:07    


 


Anisocytosis  1+   01/19/19  05:07    


 


Microcytosis  1+   01/19/19  05:07    


 


Macrocytosis  Not Reportable   01/19/19  05:07    


 


Spherocytes  Not Reportable   01/19/19  05:07    


 


Pappenheimer Bodies  Not Reportable   01/19/19  05:07    


 


Sickle Cells  Not Reportable   01/19/19  05:07    


 


Target Cells  Not Reportable   01/19/19  05:07    


 


Tear Drop Cells  Few   01/19/19  05:07    


 


Ovalocytes  2+   01/19/19  05:07    


 


Stomatocytes  2+   01/19/19  05:07    


 


Helmet Cells  Not Reportable   01/19/19  05:07    


 


Corral-Talpa Bodies  Not Reportable   01/19/19  05:07    


 


Cabot Rings  Not Reportable   01/19/19  05:07    


 


Cressona Cells  Not Reportable   01/19/19  05:07    


 


Bite Cells  Not Reportable   01/19/19  05:07    


 


Crenated Cell  Not Reportable   01/19/19  05:07    


 


Elliptocytes  Not Reportable   01/19/19  05:07    


 


Acanthocytes (Spur)  Not Reportable   01/19/19  05:07    


 


Rouleaux  Not Reportable   01/19/19  05:07    


 


Hemoglobin C Crystals  Not Reportable   01/19/19  05:07    


 


Schistocytes  Not Reportable   01/19/19  05:07    


 


Malaria parasites  Not Reportable   01/19/19  05:07    


 


Ricardo Bodies  Not Reportable   01/19/19  05:07    


 


Hem Pathologist Commnt  No   01/19/19  05:07    


 


Sodium  139 mmol/L (137-145)   01/20/19  08:35    


 


Potassium  3.6 mmol/L (3.6-5.0)   01/20/19  08:35    


 


Chloride  103.7 mmol/L ()   01/20/19  08:35    


 


Carbon Dioxide  22 mmol/L (22-30)   01/20/19  08:35    


 


Anion Gap  17 mmol/L  01/20/19  08:35    


 


BUN  4 mg/dL (9-20)  L  01/20/19  08:35    


 


Creatinine  0.9 mg/dL (0.8-1.5)   01/20/19  08:35    


 


Estimated GFR  > 60 ml/min  01/20/19  08:35    


 


BUN/Creatinine Ratio  4 %  01/20/19  08:35    


 


Glucose  105 mg/dL ()  H  01/20/19  08:35    


 


Calcium  7.5 mg/dL (8.4-10.2)  L  01/20/19  08:35    


 


Total Bilirubin  0.80 mg/dL (0.1-1.2)   01/20/19  08:35    


 


AST  109 units/L (5-40)  H  01/20/19  08:35    


 


ALT  63 units/L (7-56)  H  01/20/19  08:35    


 


Alkaline Phosphatase  113 units/L ()   01/20/19  08:35    


 


Total Protein  6.9 g/dL (6.3-8.2)   01/20/19  08:35    


 


Albumin  3.5 g/dL (3.9-5)  L  01/20/19  08:35    


 


Albumin/Globulin Ratio  1.0 %  01/20/19  08:35

## 2019-01-21 VITALS — DIASTOLIC BLOOD PRESSURE: 80 MMHG | SYSTOLIC BLOOD PRESSURE: 144 MMHG

## 2019-01-21 RX ADMIN — PANCRELIPASE LIPASE, PANCRELIPASE AMYLASE, AND PANCRELIPASE PROTEASE SCH EACH: 10500; 61500; 35500 CAPSULE, DELAYED RELEASE ORAL at 07:26

## 2019-01-21 RX ADMIN — Medication SCH: at 09:20

## 2019-01-21 RX ADMIN — HYDROXYZINE PAMOATE PRN MG: 50 CAPSULE ORAL at 08:06

## 2019-01-21 RX ADMIN — GABAPENTIN SCH MG: 100 CAPSULE ORAL at 09:24

## 2019-01-21 RX ADMIN — PANCRELIPASE LIPASE, PANCRELIPASE AMYLASE, AND PANCRELIPASE PROTEASE SCH EACH: 10500; 61500; 35500 CAPSULE, DELAYED RELEASE ORAL at 12:01

## 2019-01-21 RX ADMIN — ENOXAPARIN SODIUM SCH: 100 INJECTION SUBCUTANEOUS at 09:21

## 2019-01-21 RX ADMIN — BUPROPION HYDROCHLORIDE SCH MG: 150 TABLET, EXTENDED RELEASE ORAL at 09:23

## 2019-01-21 RX ADMIN — FAMOTIDINE SCH MG: 20 TABLET ORAL at 08:03

## 2019-01-21 RX ADMIN — ACETAMINOPHEN PRN MG: 325 TABLET ORAL at 07:05

## 2019-01-21 RX ADMIN — FOLIC ACID SCH MG: 1 TABLET ORAL at 09:24

## 2019-01-21 RX ADMIN — Medication SCH MG: at 09:24

## 2019-01-21 RX ADMIN — LISINOPRIL SCH MG: 20 TABLET ORAL at 09:24

## 2019-01-21 RX ADMIN — MEGESTROL ACETATE SCH MG: 40 TABLET ORAL at 09:24

## 2019-01-21 RX ADMIN — LORAZEPAM SCH MG: 1 TABLET ORAL at 07:05

## 2019-01-21 RX ADMIN — SODIUM CHLORIDE SCH MLS/HR: 0.9 INJECTION, SOLUTION INTRAVENOUS at 07:06

## 2019-01-21 NOTE — DISCHARGE SUMMARY
Providers





- Providers


Date of Admission: 


01/18/19 14:01





Attending physician: 


SUDHIR ROMERO MD





Primary care physician: 


PRIMARY CARE MD








Hospitalization


Reason for admission: Alcohol withdrawal


Condition: Stable


Hospital course: 





Patient was admitted to [facility] for the management of alcohol withdrawal. Patient 

was treated according to [facility] protocol and discharged home with O/P f/u at [facility] facility.


Patient has watery diarrhea and stool work up was negative, treated with IV 

fluids for dehydration and symptomatic treatment with imodium. said he has diar

chris like that previously and stopped with OTC imodium. Patient was 

hemodynamically stable at the time of discharge.


Disposition: DC-01 TO HOME OR SELFCARE


Time spent for discharge: 32 minutes





- Discharge Diagnoses


(1) Alcohol withdrawal syndrome with perceptual disturbance


Status: Acute   





(2) HLD (hyperlipidemia)


Status: Acute   


Qualifiers: 


   Hyperlipidemia type: mixed hyperlipidemia   Qualified Code(s): E78.2 - Mixed 

hyperlipidemia   





(3) HTN (hypertension)


Status: Acute   


Qualifiers: 


   Hypertension type: essential hypertension   Qualified Code(s): I10 - 

Essential (primary) hypertension   





Core Measure Documentation





- Palliative Care


Palliative Care/ Comfort Measures: Not Applicable





- Core Measures


Any of the following diagnoses?: none





Exam





- Physical Exam


Narrative exam: 





 Not in cardiopulmonary distress. 


 The patient is obese.


 Vital signs as documented.


 Head exam is unremarkable.


 No scleral icterus .


 Neck is without jugular venous distension, thyromegaly, or carotid bruits. 


 Lungs are clear to auscultation.


Cardiac exam reveals regular rate and  Rhythm. 


Abdominal exam reveals normal bowel sounds. 


Extremities are nonedematous and both femoral and pedal pulses are normal.


CNS: Alert and oriented 3.  No focal weakness.





- Constitutional


Vitals: 


                                        











Temp Pulse Resp BP Pulse Ox


 


 98.4 F   90   20   136/77   97 


 


 01/21/19 04:16  01/21/19 09:24  01/21/19 04:16  01/21/19 09:24  01/21/19 04:16














Plan


Activity: no restrictions


Weight Bearing Status: Full Weight Bearing


Diet: low salt


Additional Instructions: Patient will follow with [facility]


Follow up with: 


PRIMARY CARE,MD [Primary Care Provider] - 7 Days